# Patient Record
Sex: FEMALE | Race: OTHER
[De-identification: names, ages, dates, MRNs, and addresses within clinical notes are randomized per-mention and may not be internally consistent; named-entity substitution may affect disease eponyms.]

---

## 2020-06-03 ENCOUNTER — HOSPITAL ENCOUNTER (OUTPATIENT)
Dept: HOSPITAL 53 - M LRY | Age: 35
End: 2020-06-03
Attending: PHYSICIAN ASSISTANT
Payer: COMMERCIAL
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
## 2020-06-03 DIAGNOSIS — S99.212A: ICD-10-CM

## 2020-06-03 DIAGNOSIS — S99.922A: Primary | ICD-10-CM
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
## 2020-06-03 PROCEDURE — 73630 X-RAY EXAM OF FOOT: CPT

## 2020-06-03 PROCEDURE — 73610 X-RAY EXAM OF ANKLE: CPT

## 2020-06-03 NOTE — REP
ANKLE:

 

REASON:  Lateral pain after trauma.

 

COMPARISON: No priors.

 

FINDINGS:

 

No acute fracture or destructive osseous lesion.  The mortise is intact.

 

 

Electronically Signed by

Kranthi Roldan DO 06/03/2020 01:30 P

## 2020-06-03 NOTE — REP
FOOT:

 

REASON:  Pain after trauma, particularly laterally involving the midfoot region.

 

 

FINDINGS:  The joint spaces are symmetric and relatively well maintained.  There

is no evidence of acute fracture or destructive osseous lesion.

 

IMPRESSION:

 

Negative.

 

 

 

 

Electronically Signed by

Kranthi Roldan DO 06/03/2020 01:30 P